# Patient Record
Sex: FEMALE | Race: OTHER | NOT HISPANIC OR LATINO | Employment: STUDENT | URBAN - METROPOLITAN AREA
[De-identification: names, ages, dates, MRNs, and addresses within clinical notes are randomized per-mention and may not be internally consistent; named-entity substitution may affect disease eponyms.]

---

## 2018-02-15 ENCOUNTER — HOSPITAL ENCOUNTER (EMERGENCY)
Facility: HOSPITAL | Age: 18
Discharge: HOME | End: 2018-02-15
Attending: EMERGENCY MEDICINE | Admitting: EMERGENCY MEDICINE
Payer: COMMERCIAL

## 2018-02-15 VITALS
HEIGHT: 65 IN | RESPIRATION RATE: 18 BRPM | DIASTOLIC BLOOD PRESSURE: 61 MMHG | BODY MASS INDEX: 26.66 KG/M2 | HEART RATE: 100 BPM | OXYGEN SATURATION: 100 % | WEIGHT: 160 LBS | SYSTOLIC BLOOD PRESSURE: 137 MMHG | TEMPERATURE: 98 F

## 2018-02-15 DIAGNOSIS — R00.0 TACHYCARDIA: ICD-10-CM

## 2018-02-15 DIAGNOSIS — K29.70 GASTRITIS: Primary | ICD-10-CM

## 2018-02-15 LAB
ALBUMIN SERPL BCP-MCNC: 3.9 G/DL (ref 3.5–5)
ALP SERPL-CCNC: 40 U/L (ref 46–384)
ALT SERPL W P-5'-P-CCNC: 19 U/L (ref 12–78)
ANION GAP SERPL CALCULATED.3IONS-SCNC: 8 MMOL/L (ref 4–13)
AST SERPL W P-5'-P-CCNC: 18 U/L (ref 5–45)
BACTERIA UR QL AUTO: ABNORMAL /HPF
BASOPHILS # BLD AUTO: 0.01 THOUSANDS/ΜL (ref 0–0.1)
BASOPHILS NFR BLD AUTO: 0 % (ref 0–1)
BILIRUB SERPL-MCNC: 0.44 MG/DL (ref 0.2–1)
BILIRUB UR QL STRIP: NEGATIVE
BUN SERPL-MCNC: 8 MG/DL (ref 5–25)
CALCIUM SERPL-MCNC: 8.4 MG/DL (ref 8.3–10.1)
CHLORIDE SERPL-SCNC: 105 MMOL/L (ref 100–108)
CLARITY UR: CLEAR
CO2 SERPL-SCNC: 25 MMOL/L (ref 21–32)
COLOR UR: YELLOW
COLOR, POC: YELLOW
CREAT SERPL-MCNC: 0.74 MG/DL (ref 0.6–1.3)
EOSINOPHIL # BLD AUTO: 0.01 THOUSAND/ΜL (ref 0–0.61)
EOSINOPHIL NFR BLD AUTO: 0 % (ref 0–6)
ERYTHROCYTE [DISTWIDTH] IN BLOOD BY AUTOMATED COUNT: 13.2 % (ref 11.6–15.1)
EXT PREG TEST URINE: NEGATIVE
GFR SERPL CREATININE-BSD FRML MDRD: 118 ML/MIN/1.73SQ M
GLUCOSE SERPL-MCNC: 95 MG/DL (ref 65–140)
GLUCOSE UR STRIP-MCNC: NEGATIVE MG/DL
HCT VFR BLD AUTO: 37.8 % (ref 34.8–46.1)
HGB BLD-MCNC: 12.9 G/DL (ref 11.5–15.4)
HGB UR QL STRIP.AUTO: ABNORMAL
KETONES UR STRIP-MCNC: ABNORMAL MG/DL
LEUKOCYTE ESTERASE UR QL STRIP: ABNORMAL
LIPASE SERPL-CCNC: 124 U/L (ref 73–393)
LYMPHOCYTES # BLD AUTO: 0.9 THOUSANDS/ΜL (ref 0.6–4.47)
LYMPHOCYTES NFR BLD AUTO: 16 % (ref 14–44)
MCH RBC QN AUTO: 28.2 PG (ref 26.8–34.3)
MCHC RBC AUTO-ENTMCNC: 34.1 G/DL (ref 31.4–37.4)
MCV RBC AUTO: 83 FL (ref 82–98)
MONOCYTES # BLD AUTO: 0.5 THOUSAND/ΜL (ref 0.17–1.22)
MONOCYTES NFR BLD AUTO: 9 % (ref 4–12)
MUCOUS THREADS UR QL AUTO: ABNORMAL
NEUTROPHILS # BLD AUTO: 4.35 THOUSANDS/ΜL (ref 1.85–7.62)
NEUTS SEG NFR BLD AUTO: 75 % (ref 43–75)
NITRITE UR QL STRIP: NEGATIVE
NON-SQ EPI CELLS URNS QL MICRO: ABNORMAL /HPF
NRBC BLD AUTO-RTO: 0 /100 WBCS
PH UR STRIP.AUTO: 5.5 [PH] (ref 4.5–8)
PLATELET # BLD AUTO: 244 THOUSANDS/UL (ref 149–390)
PMV BLD AUTO: 9.7 FL (ref 8.9–12.7)
POTASSIUM SERPL-SCNC: 3.8 MMOL/L (ref 3.5–5.3)
PROT SERPL-MCNC: 8 G/DL (ref 6.4–8.2)
PROT UR STRIP-MCNC: ABNORMAL MG/DL
RBC # BLD AUTO: 4.58 MILLION/UL (ref 3.81–5.12)
RBC #/AREA URNS AUTO: ABNORMAL /HPF
SODIUM SERPL-SCNC: 138 MMOL/L (ref 136–145)
SP GR UR STRIP.AUTO: 1.02 (ref 1–1.03)
UROBILINOGEN UR QL STRIP.AUTO: 0.2 E.U./DL
WBC # BLD AUTO: 5.78 THOUSAND/UL (ref 4.31–10.16)
WBC #/AREA URNS AUTO: ABNORMAL /HPF

## 2018-02-15 PROCEDURE — 96374 THER/PROPH/DIAG INJ IV PUSH: CPT

## 2018-02-15 PROCEDURE — 85025 COMPLETE CBC W/AUTO DIFF WBC: CPT | Performed by: EMERGENCY MEDICINE

## 2018-02-15 PROCEDURE — 36415 COLL VENOUS BLD VENIPUNCTURE: CPT

## 2018-02-15 PROCEDURE — 99284 EMERGENCY DEPT VISIT MOD MDM: CPT

## 2018-02-15 PROCEDURE — 83690 ASSAY OF LIPASE: CPT | Performed by: EMERGENCY MEDICINE

## 2018-02-15 PROCEDURE — 81025 URINE PREGNANCY TEST: CPT | Performed by: EMERGENCY MEDICINE

## 2018-02-15 PROCEDURE — 81002 URINALYSIS NONAUTO W/O SCOPE: CPT | Performed by: EMERGENCY MEDICINE

## 2018-02-15 PROCEDURE — 96361 HYDRATE IV INFUSION ADD-ON: CPT

## 2018-02-15 PROCEDURE — 80053 COMPREHEN METABOLIC PANEL: CPT | Performed by: EMERGENCY MEDICINE

## 2018-02-15 PROCEDURE — 81001 URINALYSIS AUTO W/SCOPE: CPT

## 2018-02-15 RX ORDER — DICYCLOMINE HCL 20 MG
20 TABLET ORAL 2 TIMES DAILY
Qty: 20 TABLET | Refills: 0 | Status: SHIPPED | OUTPATIENT
Start: 2018-02-15 | End: 2020-02-11

## 2018-02-15 RX ORDER — NORETHINDRONE ACETATE AND ETHINYL ESTRADIOL AND FERROUS FUMARATE 1MG-20(24)
1 KIT ORAL DAILY
COMMUNITY

## 2018-02-15 RX ORDER — ONDANSETRON 2 MG/ML
4 INJECTION INTRAMUSCULAR; INTRAVENOUS ONCE
Status: COMPLETED | OUTPATIENT
Start: 2018-02-15 | End: 2018-02-15

## 2018-02-15 RX ORDER — ONDANSETRON 4 MG/1
4 TABLET, FILM COATED ORAL EVERY 6 HOURS
Qty: 12 TABLET | Refills: 0 | Status: SHIPPED | OUTPATIENT
Start: 2018-02-15 | End: 2020-02-11

## 2018-02-15 RX ORDER — DICYCLOMINE HCL 20 MG
20 TABLET ORAL ONCE
Status: COMPLETED | OUTPATIENT
Start: 2018-02-15 | End: 2018-02-15

## 2018-02-15 RX ORDER — MAGNESIUM HYDROXIDE/ALUMINUM HYDROXICE/SIMETHICONE 120; 1200; 1200 MG/30ML; MG/30ML; MG/30ML
30 SUSPENSION ORAL ONCE
Status: COMPLETED | OUTPATIENT
Start: 2018-02-15 | End: 2018-02-15

## 2018-02-15 RX ADMIN — LIDOCAINE HYDROCHLORIDE 15 ML: 20 SOLUTION ORAL; TOPICAL at 19:31

## 2018-02-15 RX ADMIN — SODIUM CHLORIDE 1000 ML: 0.9 INJECTION, SOLUTION INTRAVENOUS at 19:26

## 2018-02-15 RX ADMIN — ONDANSETRON 4 MG: 2 INJECTION INTRAMUSCULAR; INTRAVENOUS at 19:28

## 2018-02-15 RX ADMIN — ALUMINUM HYDROXIDE, MAGNESIUM HYDROXIDE, AND SIMETHICONE 30 ML: 200; 200; 20 SUSPENSION ORAL at 19:31

## 2018-02-15 RX ADMIN — DICYCLOMINE HYDROCHLORIDE 20 MG: 20 TABLET ORAL at 19:29

## 2018-02-16 NOTE — ED NOTES
Pt  Discharged from department by Dr Rimma Bettencourt  RN not present during discharge        Osito Alcocer RN  02/15/18 7876

## 2018-02-16 NOTE — ED PROVIDER NOTES
History  Chief Complaint   Patient presents with    Abdominal Pain     Pt was seen at [facility] for abdominal pain, cough, congestion, nausea and diarrhea and was found to be tachycardic   pt told to go to the ER for evaluation  66-year-old female presenting to the ER today with a 1 day history of upper abdominal pain, nausea, vomiting and diarrhea  Patient is a student at [employer] and went to the [facility] and was referred to the ER for tachycardia and suspicion of dehydration  Symptoms began this morning  Symptoms are constant  Worsened with eating and drinking  Cannot identify any palliating factors  Denies fevers, chills  Denies sick contacts  Does states she eat red meat last night for the 1st time in months  Denies any new medications  History provided by:  Patient   used: No    Abdominal Pain   Pain location:  Epigastric  Pain quality: aching    Pain radiates to:  Does not radiate  Pain severity:  Mild  Onset quality:  Gradual  Timing:  Constant  Progression:  Unchanged  Chronicity:  New  Context: diet changes    Relieved by:  Nothing  Worsened by:  Nothing  Ineffective treatments:  None tried  Associated symptoms: no chills, no constipation, no diarrhea, no dysuria, no fatigue, no fever, no hematuria, no nausea, no vaginal bleeding, no vaginal discharge and no vomiting        Prior to Admission Medications   Prescriptions Last Dose Informant Patient Reported? Taking?   norethindrone-ethinyl estradiol-ferrous fumarate (LOESTIN 24 FE) 1-20 MG-MCG(24) per tablet   Yes Yes   Sig: Take 1 tablet by mouth daily      Facility-Administered Medications: None       History reviewed  No pertinent past medical history  History reviewed  No pertinent surgical history  History reviewed  No pertinent family history  I have reviewed and agree with the history as documented      Social History   Substance Use Topics    Smoking status: Never Smoker    Smokeless tobacco: Never Used    Alcohol use No        Review of Systems   Constitutional: Negative  Negative for appetite change, chills, diaphoresis, fatigue and fever  HENT: Negative  Eyes: Negative  Respiratory: Negative  Cardiovascular: Negative  Gastrointestinal: Positive for abdominal pain  Negative for blood in stool, constipation, diarrhea, nausea and vomiting  Endocrine: Negative  Genitourinary: Negative for decreased urine volume, difficulty urinating, dyspareunia, dysuria, flank pain, frequency, hematuria, pelvic pain, urgency, vaginal bleeding, vaginal discharge and vaginal pain  Musculoskeletal: Negative  Skin: Negative  Allergic/Immunologic: Negative  Neurological: Negative  Psychiatric/Behavioral: Negative  All other systems reviewed and are negative  Physical Exam  ED Triage Vitals [02/15/18 1801]   Temperature Pulse Respirations Blood Pressure SpO2   98 °F (36 7 °C) (!) 116 18 126/66 98 %      Temp Source Heart Rate Source Patient Position - Orthostatic VS BP Location FiO2 (%)   Oral Monitor Sitting Right arm --      Pain Score       6           Orthostatic Vital Signs  Vitals:    02/15/18 1801 02/15/18 1930 02/15/18 2030   BP: 126/66 137/64 137/61   Pulse: (!) 116 104 100   Patient Position - Orthostatic VS: Sitting         Physical Exam   Constitutional: She is oriented to person, place, and time  She appears well-developed and well-nourished  HENT:   Head: Normocephalic and atraumatic  Right Ear: External ear normal    Left Ear: External ear normal    Nose: Nose normal    Mouth/Throat: Oropharynx is clear and moist    Eyes: Conjunctivae and EOM are normal  Pupils are equal, round, and reactive to light  Neck: Normal range of motion  Neck supple  No JVD present  No tracheal deviation present  No thyromegaly present  Cardiovascular: Normal rate, regular rhythm, normal heart sounds and intact distal pulses    Exam reveals no gallop and no friction rub  No murmur heard  Pulmonary/Chest: Effort normal and breath sounds normal  No stridor  No respiratory distress  She has no wheezes  She has no rales  She exhibits no tenderness  Abdominal: Soft  Bowel sounds are normal  She exhibits no distension and no mass  There is tenderness in the epigastric area  There is no rebound and no guarding  No hernia  Musculoskeletal: Normal range of motion  She exhibits no edema, tenderness or deformity  Lymphadenopathy:     She has no cervical adenopathy  Neurological: She is alert and oriented to person, place, and time  She has normal reflexes  She displays normal reflexes  No cranial nerve deficit  She exhibits normal muscle tone  Coordination normal    Skin: Skin is warm  No rash noted  No erythema  No pallor  Psychiatric: She has a normal mood and affect  Her behavior is normal  Judgment and thought content normal    Nursing note and vitals reviewed        ED Medications  Medications   sodium chloride 0 9 % bolus 1,000 mL (0 mL Intravenous Stopped 2/15/18 2041)   ondansetron (ZOFRAN) injection 4 mg (4 mg Intravenous Given 2/15/18 1928)   aluminum-magnesium hydroxide-simethicone (MYLANTA) 200-200-20 mg/5 mL oral suspension 30 mL (30 mL Oral Given 2/15/18 1931)   lidocaine viscous (XYLOCAINE) 2 % mucosal solution 15 mL (15 mL Swish & Spit Given 2/15/18 1931)   dicyclomine (BENTYL) tablet 20 mg (20 mg Oral Given 2/15/18 1929)       Diagnostic Studies  Results Reviewed     Procedure Component Value Units Date/Time    Urine Microscopic [[id number]]  (Abnormal) Collected:  02/15/18 2122    Lab Status:  Final result Specimen:  Urine from Urine, Other Updated:  02/15/18 2205     RBC, UA None Seen /hpf      WBC, UA 4-10 (A) /hpf      Epithelial Cells Occasional /hpf      Bacteria, UA Occasional /hpf      MUCOUS THREADS Occasional    POCT pregnancy, urine [[id number]]  (Normal) Resulted:  02/15/18 2120    Lab Status:  Final result Updated:  02/15/18 2120     EXT PREG TEST UR (Ref: Negative) negative    POCT urinalysis dipstick [[id number]]  (Normal) Resulted:  02/15/18 2120    Lab Status:  Final result Specimen:  Urine Updated:  02/15/18 2120     Color, UA yellow    ED Urine Macroscopic [[id number]]  (Abnormal) Collected:  02/15/18 2122    Lab Status:  Final result Specimen:  Urine Updated:  02/15/18 2118     Color, UA Yellow     Clarity, UA Clear     pH, UA 5 5     Leukocytes, UA Small (A)     Nitrite, UA Negative     Protein, UA Trace (A) mg/dl      Glucose, UA Negative mg/dl      Ketones, UA Trace (A) mg/dl      Urobilinogen, UA 0 2 E U /dl      Bilirubin, UA Negative     Blood, UA Large (A)     Specific [place], UA 1 025    Narrative:       CLINITEK RESULT    Comprehensive metabolic panel [[id number]]  (Abnormal) Collected:  02/15/18 1814    Lab Status:  Final result Specimen:  Blood from Arm, Right Updated:  02/15/18 1901     Sodium 138 mmol/L      Potassium 3 8 mmol/L      Chloride 105 mmol/L      CO2 25 mmol/L      Anion Gap 8 mmol/L      BUN 8 mg/dL      Creatinine 0 74 mg/dL      Glucose 95 mg/dL      Calcium 8 4 mg/dL      AST 18 U/L      ALT 19 U/L      Alkaline Phosphatase 40 (L) U/L      Total Protein 8 0 g/dL      Albumin 3 9 g/dL      Total Bilirubin 0 44 mg/dL      eGFR 118 ml/min/1 73sq m     Narrative:         National Kidney Disease Education Program recommendations are as follows:  GFR calculation is accurate only with a steady state creatinine  Chronic Kidney disease less than 60 ml/min/1 73 sq  meters  Kidney failure less than 15 ml/min/1 73 sq  meters      Lipase [[id number]]  (Normal) Collected:  02/15/18 1814    Lab Status:  Final result Specimen:  Blood from Arm, Right Updated:  02/15/18 1901     Lipase 124 u/L     CBC and differential [[id number]]  (Normal) Collected:  02/15/18 1814    Lab Status:  Final result Specimen:  Blood from Arm, Right Updated:  02/15/18 1823     WBC 5 78 Thousand/uL      RBC 4 58 Million/uL      Hemoglobin 12 9 g/dL Hematocrit 37 8 %      MCV 83 fL      MCH 28 2 pg      MCHC 34 1 g/dL      RDW 13 2 %      MPV 9 7 fL      Platelets 411 Thousands/uL      nRBC 0 /100 WBCs      Neutrophils Relative 75 %      Lymphocytes Relative 16 %      Monocytes Relative 9 %      Eosinophils Relative 0 %      Basophils Relative 0 %      Neutrophils Absolute 4 35 Thousands/µL      Lymphocytes Absolute 0 90 Thousands/µL      Monocytes Absolute 0 50 Thousand/µL      Eosinophils Absolute 0 01 Thousand/µL      Basophils Absolute 0 01 Thousands/µL                  No orders to display         Procedures  Procedures      Phone Consults  ED Phone Contact    ED Course  ED Course as of Feb 16 0101   u Feb 15, 2018   2119 Leukocytes, UA: (!) Small   2119 Ketones, UA: (!) Trace   2126   On re-evaluation patient's symptoms have improved  No longer tachycardic    Will discharge patient at this time                                MDM  Number of Diagnoses or Management Options  Gastritis: new and requires workup  Tachycardia: new and requires workup  Diagnosis management comments: Assessment:  -abdominal pain epigastric  -nausea  -vomiting and diarrhea  -tachycardia  Plan:  -lipase to rule out pancreatitis  -CMP to evaluate electrolytes assess hepatic biliary function  -UA with U preg  -will treat symptoms with IV fluids, Zofran, Bentyl, GI cocktail  -will re-evaluate       Amount and/or Complexity of Data Reviewed  Clinical lab tests: ordered and reviewed  Tests in the radiology section of CPT®: ordered and reviewed  Tests in the medicine section of CPT®: reviewed and ordered  Decide to obtain previous medical records or to obtain history from someone other than the patient: yes  Independent visualization of images, tracings, or specimens: yes    Patient Progress  Patient progress: stable    CritCare Time    Disposition  Final diagnoses:   Gastritis   Tachycardia     Time reflects when diagnosis was documented in both MDM as applicable and the Disposition within this note     Time User Action Codes Description Comment    2/15/2018  9:26 PM Chele Rodarte Add [K29 70] Gastritis     2/15/2018  9:27 PM SangVicky Add [R00 0] Tachycardia       ED Disposition     ED Disposition Condition Comment    Discharge  [facility]/self care  Condition at discharge: Good        Follow-up Information     Follow up With Specialties Details Why Contact Info    [employer]fol[employer]  Schedule an appointment as soon as possible for a visit  [phone]          Discharge Medication List as of 2/15/2018  9:30 PM      START taking these medications    Details   dicyclomine (BENTYL) 20 mg tablet Take 1 tablet (20 mg total) by mouth 2 (two) times a day, Starting Thu 2/15/2018, Print      ondansetron (ZOFRAN) 4 mg tablet Take 1 tablet (4 mg total) by mouth every 6 (six) hours, Starting Thu 2/15/2018, Print         CONTINUE these medications which have NOT CHANGED    Details   norethindrone-ethinyl estradiol-ferrous fumarate (LOESTIN 24 FE) 1-20 MG-MCG(24) per tablet Take 1 tablet by mouth daily, Historical Med           No discharge procedures on file  ED Provider  Attending physically available and evaluated [facility]  I managed the patient along with the ED Attending      Electronically Signed by         Quiana Dye DO  02/16/18 010

## 2018-02-16 NOTE — DISCHARGE INSTRUCTIONS
Atrial Tachycardia   WHAT YOU SHOULD KNOW:   Atrial tachycardia (AT) is a condition that causes your heart to beat 100 to 300 times each minute  A normal heart rate at rest is 60 to 80 beats each minute  AT develops because of problems with your heart's electrical system  Your atria (top chambers) may send electrical signals that increase your heart rate, or the pathway of the electrical signal may be blocked  Your heart keeps sending signals to try to get past the block  CARE AGREEMENT:   You have the right to help plan your care  Learn about your health condition and how it may be treated  Discuss treatment options with your caregivers to decide what care you want to receive  You always have the right to refuse treatment  RISKS:   · Medicines to treat your AT may cause your heart to beat too slowly or your blood pressure to drop  Certain medicines may cause other types of heartbeat problems  Cardiac ablation can cause you to bleed, bruise, or get an infection where the wire was put in  Even after treatment, your AT and symptoms may return, and you may need more treatments  · Without treatment, your symptoms may get worse  Your heart may not be able to pump enough blood to supply oxygen to the rest of your body  You may get a blood clot The clot can break loose and travel to your lungs or brain  A blood clot can cause you to have a stroke  Your heart may weaken and not work properly  You are also at a higher risk for a heart attack and heart failure  WHILE YOU ARE HERE:   Informed consent  is a legal document that explains the tests, treatments, or procedures that you may need  Informed consent means you understand what will be done and can make decisions about what you want  You give your permission when you sign the consent form  You can have someone sign this form for you if you are not able to sign it  You have the right to understand your medical care in words you know   Before you sign the consent form, understand the risks and benefits of what will be done  Make sure all your questions are answered  An IV  is a small tube placed in your vein that is used to give you medicine or liquids  Heart monitor: This is also called an ECG or EKG  Sticky pads placed on your skin record your heart's electrical activity  Medicines:   · Antiarrhythmias: These help slow your heartbeat and make it more normal      · Beta blockers: These help keep your heartbeat in a regular rhythm  · Calcium channel blockers: These help slow your heartbeat  · Blood thinners: These help prevent blood clots  Clots can lead to stroke, heart attack, and death  Aspirin is a type of blood thinner  You may need to take an aspirin each day to help prevent blood clots  Do not take acetaminophen or ibuprofen instead  Do not take more or less aspirin than healthcare providers say to take  If you are on other blood thinner medicine, ask your healthcare provider before you take aspirin for any reason  · Electrolytes: You may be given electrolytes in the hospital if an electrolyte imbalance caused your AT  Tests:   · Blood tests: You may need blood taken to give caregivers information about how your body is working  The blood may be taken from your hand, arm, or IV  · Electrophysiologic study: An electrophysiologic study is used to chart the electrical pathways in your heart that control your heartbeat  Wires are guided through a blood vessel in your arm, neck, chest, or groin to your heart  Readings are taken through the wires  Your healthcare provider can also use these wires to trigger your heart rhythm problem  · An echocardiogram  is a type of ultrasound  Sound waves are used to show the structure and function of your heart  · ECG:  This is also called an EKG  An ECG is done to check for damage or problems in your heart  A short period of electrical activity in your heart is recorded   Lie as still as possible during the test      · Atrial electrograms:  Atrial electrograms are ECG recordings taken from temporary wires attached to your atria during heart surgery  The wires are connected to a monitor and show your heart's atrial activity  This test is done if healthcare providers believe you are having AT after heart surgery  · Exercise stress test:  An exercise stress test helps healthcare providers see the changes that take place in your heart during exercise  The test is done while you ride an exercise bike or walk on a treadmill  Healthcare providers will ask if you have chest pain or trouble breathing during the test   Treatment:   · Vagal maneuvers:  Vagal maneuvers (methods) can help slow the impulse from your atria to your ventricle and stop your AT  An example of a vagal maneuver is putting your face in ice cold water  Your healthcare provider may teach you other vagal maneuvers to do on your own when you have an episode of AT  · Cardioversion: This is a procedure where an electric shock is given to your heart  The shock is usually given through paddles or sticky patches placed on your chest or back  The shock helps your heart return to a normal beat  Cardioversion may be needed if medicine does not make your heart work better  You may need a cardioversion if your heart rhythm is making you sick or is dangerous  You may be given medicine to help you relax before getting the electric shock  If the shock works, your heart rate and rhythm will return to normal  Medicine may be needed to keep your heart in a normal rhythm  You may need a cardioversion more than once  · Cardiac ablation:  Cardiac ablation is a procedure that uses heat energy to stop abnormal heart impulses  A wire is guided to your heart through an artery or a vein  Your healthcare provider finds the area of the heart that is causing the problems and applies heat energy to it  This may help your heart beat in a more regular rhythm      · Pacemaker: This is a machine that helps your heart beat at a normal speed and in a regular rhythm  If your heart does not beat as it should, the pacemaker sends small electric signals to your heart  You may feel these signals  ¨ Temporary:  Large patches are placed on your chest and back  The patches are connected to a monitor  Your healthcare provider may need to put small wires through your skin and into your heart muscle instead  The wires are connected to a small pacemaker box outside of your body  ¨ Permanent:  A permanent pacemaker is about the size of a wristwatch  It is implanted under the skin of your chest     · Surgery: You may need surgery if other treatments do not work to stop your AT   © 2014 [address] is for End User's use only and may not be sold, redistributed or otherwise used for commercial purposes  All illustrations and images included in CareNotes® are the copyrighted property of [employer]  or [employer]  The above information is an  only  It is not intended as medical advice for individual conditions or treatments  Talk to your doctor, nurse or pharmacist before following any medical regimen to see if it is safe and effective for you

## 2018-02-26 NOTE — ED ATTENDING ATTESTATION
Lauri Souza MD, saw and evaluated the patient  I have discussed the patient with the resident/non-physician practitioner and agree with the resident's/non-physician practitioner's findings, Plan of Care, and MDM as documented in the resident's/non-physician practitioner's note, except where noted  All available labs and Radiology studies were reviewed  At this point I agree with the current assessment done in the Emergency Department  I have conducted an independent evaluation of this patient a history and physical is as follows:    Patient presents with 1 day of upper abdominal pain and N/V/D  Patient was found to be tachycardic at [facility] and was sent to the ED  Patient's symptoms made worse with eating and drinking  No additional complaints  Exam: AAOx3, NAD, RRR, CTA, epigastric TTP  A/P: Abdominal pain, tachycardia  Will check labs to evaluate abd abnormalities, EKG, urine, and give IVF      Critical Care Time  CritCare Time    Procedures

## 2020-02-11 ENCOUNTER — HOSPITAL ENCOUNTER (EMERGENCY)
Facility: HOSPITAL | Age: 20
Discharge: HOME | End: 2020-02-11
Attending: EMERGENCY MEDICINE | Admitting: EMERGENCY MEDICINE
Payer: COMMERCIAL

## 2020-02-11 ENCOUNTER — APPOINTMENT (EMERGENCY)
Dept: RADIOLOGY | Facility: HOSPITAL | Age: 20
End: 2020-02-11
Payer: COMMERCIAL

## 2020-02-11 VITALS
OXYGEN SATURATION: 98 % | SYSTOLIC BLOOD PRESSURE: 119 MMHG | TEMPERATURE: 100 F | RESPIRATION RATE: 18 BRPM | WEIGHT: 147.27 LBS | BODY MASS INDEX: 24.51 KG/M2 | HEART RATE: 80 BPM | DIASTOLIC BLOOD PRESSURE: 76 MMHG

## 2020-02-11 DIAGNOSIS — J11.1 INFLUENZA: Primary | ICD-10-CM

## 2020-02-11 LAB
FLUAV RNA NPH QL NAA+PROBE: ABNORMAL
FLUBV RNA NPH QL NAA+PROBE: DETECTED
RSV RNA NPH QL NAA+PROBE: ABNORMAL

## 2020-02-11 PROCEDURE — 93005 ELECTROCARDIOGRAM TRACING: CPT

## 2020-02-11 PROCEDURE — 99285 EMERGENCY DEPT VISIT HI MDM: CPT | Performed by: EMERGENCY MEDICINE

## 2020-02-11 PROCEDURE — 99284 EMERGENCY DEPT VISIT MOD MDM: CPT

## 2020-02-11 PROCEDURE — 71046 X-RAY EXAM CHEST 2 VIEWS: CPT

## 2020-02-11 PROCEDURE — 87631 RESP VIRUS 3-5 TARGETS: CPT | Performed by: EMERGENCY MEDICINE

## 2020-02-11 RX ORDER — ACETAMINOPHEN 325 MG/1
975 TABLET ORAL ONCE
Status: COMPLETED | OUTPATIENT
Start: 2020-02-11 | End: 2020-02-11

## 2020-02-11 RX ORDER — IBUPROFEN 600 MG/1
600 TABLET ORAL ONCE
Status: COMPLETED | OUTPATIENT
Start: 2020-02-11 | End: 2020-02-11

## 2020-02-11 RX ADMIN — ACETAMINOPHEN 975 MG: 325 TABLET ORAL at 19:04

## 2020-02-11 RX ADMIN — IBUPROFEN 600 MG: 600 TABLET ORAL at 19:04

## 2020-02-12 LAB
ATRIAL RATE: 86 BPM
P AXIS: 30 DEGREES
PR INTERVAL: 142 MS
QRS AXIS: 31 DEGREES
QRSD INTERVAL: 78 MS
QT INTERVAL: 330 MS
QTC INTERVAL: 394 MS
T WAVE AXIS: 30 DEGREES
VENTRICULAR RATE: 86 BPM

## 2020-02-12 PROCEDURE — 93010 ELECTROCARDIOGRAM REPORT: CPT | Performed by: INTERNAL MEDICINE

## 2020-02-12 NOTE — ED ATTENDING ATTESTATION
2/11/2020  I, Roxana Lala MD, saw and evaluated the patient  I have discussed the patient with the resident/non-physician practitioner and agree with the resident's/non-physician practitioner's findings, Plan of Care, and MDM as documented in the resident's/non-physician practitioner's note, except where noted  All available labs and Radiology studies were reviewed  I was present for key portions of any procedure(s) performed by the resident/non-physician practitioner and I was immediately available to provide assistance  At this point I agree with the current assessment done in the Emergency Department    I have conducted an independent evaluation of this patient a history and physical is as follows:  Pt was sent with tachycardia uri symptoms since Friday Pt has a cough no sob no ha Pt has had low grade temps Last antipyretic was at noon no nvd  Pt was seen at [facility] and was given po fluids still with some tachy  Pt denies chest painPE: alert nad heart reg lungs clear pharynx wnl abd soft nontender MDM: will check flu ekg cxr  ED Course         Critical Care Time  Procedures

## 2020-02-14 NOTE — ED PROVIDER NOTES
History  Chief Complaint   Patient presents with    Fever - 9 weeks to 76 years     seen at [facility] for URI, and fever, was drinking PO fluids but HR remained high so they sent her to ER, given advil at 1200, no N/V, cough, body aches     23 yo F presenting for evaluation from [facility] today for tachycardia in the setting of fever and likely viral illness  Has been experiencing myalgias, and fever since yesterday  Mild runny nose, mild nonproductive cough  Denies any respiratory distress or shortness of breath  No nausea, vomiting, diarrhea or other GI symptoms  Denies rashes or HA  Was giving large amount of fluids PO at [facility] but still had tachycardia up to 115 so she was sent here for further eval  No CP or palpitations  ROS otherwise neg as below  History provided by:  Patient   used: No        Prior to Admission Medications   Prescriptions Last Dose Informant Patient Reported? Taking?   norethindrone-ethinyl estradiol-ferrous fumarate (LOESTIN 24 FE) 1-20 MG-MCG(24) per tablet   Yes Yes   Sig: Take 1 tablet by mouth daily      Facility-Administered Medications: None       History reviewed  No pertinent past medical history  History reviewed  No pertinent surgical history  History reviewed  No pertinent family history  I have reviewed and agree with the history as documented  Social History     Tobacco Use    Smoking status: Never Smoker    Smokeless tobacco: Never Used   Substance Use Topics    Alcohol use: No    Drug use: No        Review of Systems   Constitutional: Negative for chills, fatigue and fever  HENT: Positive for rhinorrhea  Negative for congestion and sore throat  Eyes: Negative for redness and visual disturbance  Respiratory: Positive for cough  Negative for shortness of breath and wheezing  Cardiovascular: Negative for chest pain and palpitations     Gastrointestinal: Negative for abdominal pain, diarrhea, nausea and vomiting  Genitourinary: Negative for difficulty urinating, dysuria, hematuria and urgency  Musculoskeletal: Negative for back pain and gait problem  Skin: Negative for pallor and rash  Neurological: Negative for dizziness, syncope, weakness, light-headedness and headaches  Psychiatric/Behavioral: Negative for confusion  The patient is not nervous/anxious  Physical Exam  ED Triage Vitals [02/11/20 1706]   Temperature Pulse Respirations Blood Pressure SpO2   100 °F (37 8 °C) (!) 117 18 139/89 100 %      Temp Source Heart Rate Source Patient Position - Orthostatic VS BP Location FiO2 (%)   Oral Monitor Sitting Right arm --      Pain Score       No Pain             Orthostatic Vital Signs  Vitals:    02/11/20 1900 02/11/20 1906 02/11/20 1945 02/11/20 2100   BP: 120/60 120/60  119/76   Pulse: (!) 118 (!) 122 98 80   Patient Position - Orthostatic VS: Lying Sitting  Lying       Physical Exam   Constitutional: She appears well-developed and well-nourished  No distress  HENT:   Head: Normocephalic and atraumatic  Eyes: Pupils are equal, round, and reactive to light  Conjunctivae are normal    Neck: Normal range of motion  Neck supple  Cardiovascular: Normal rate, regular rhythm and normal heart sounds  No murmur heard  Pulmonary/Chest: Effort normal and breath sounds normal  No respiratory distress  She has no wheezes  She has no rales  Abdominal: Soft  Bowel sounds are normal  She exhibits no distension  There is no tenderness  Musculoskeletal: Normal range of motion  Neurological: She is alert  Skin: Skin is warm and dry  No rash noted  She is not diaphoretic  Psychiatric: She has a normal mood and affect  Nursing note and vitals reviewed        ED Medications  Medications   ibuprofen (MOTRIN) tablet 600 mg (600 mg Oral Given 2/11/20 1904)   acetaminophen (TYLENOL) tablet 975 mg (975 mg Oral Given 2/11/20 1904)       Diagnostic Studies  Results Reviewed Procedure Component Value Units Date/Time    Influenza A/B and RSV PCR [[id number]]  (Abnormal) Collected:  02/11/20 1915    Lab Status:  Final result Specimen:  Nasopharyngeal Swab Updated:  02/11/20 2010     INFLUENZA A PCR None Detected     INFLUENZA B PCR Detected     RSV PCR None Detected                 XR chest 2 views   ED Interpretation by Beth Gee MD (02/11 2011)   No acute cardiopulm process  Final Result by Rhoda Villegas MD (02/11 2100)      No acute cardiopulmonary disease  Workstation performed: [id number]               Procedures  ECG 12 Lead Documentation Only  Date/Time: 2/11/2020 5:26 PM  Performed by: Beth Gee MD  Authorized by: Beth Gee MD     Comments:      NSR, tachycardia resolved           ED Course  ED Course as of Feb 14 1728 Tue Feb 11, 2020 2010 INFLU B PCR(!): Detected                               MDM  Number of Diagnoses or Management Options  Influenza:   Diagnosis management comments: 21 yo F presenting for evaluation of fever, URI symptoms and tachycardia  Well appearing on exam, mild tachycardia resolved shortly after triage and evaluation  Concern for possible influenza given fevers and myalgias  Influenza testing positive  Discussed tamiflu, patient declined due to side effects  Supportive care at home  Discharged in good condition  Disposition  Final diagnoses:   Influenza     Time reflects when diagnosis was documented in both MDM as applicable and the Disposition within this note     Time User Action Codes Description Comment    2/11/2020  9:13 PM Adeel Green Add [J11 1] Influenza       ED Disposition     ED Disposition Condition Date/Time Comment    Discharge Stable Tue Feb 11, 2020  9:13 PM Venancio Blizzard discharge to home/self care              Follow-up Information     Follow up With Specialties Details Why Contact Info Additional [address] [facility] Schedule an appointment as soon as possible for a visit  As needed [address] [facility] [id number]  [address] [facility], [address] [facility]  [facility], [place], [place], [address]   [facility] Emergency Department Emergency Medicine Go to  As needed [address] [facility], [address], [address] [phone]          Discharge Medication List as of 2/11/2020  9:13 PM      CONTINUE these medications which have NOT CHANGED    Details   norethindrone-ethinyl estradiol-ferrous fumarate (LOESTIN 24 FE) 1-20 MG-MCG(24) per tablet Take 1 tablet by mouth daily, Historical Med           No discharge procedures on file  ED Provider  Attending physically available and evaluated [facility]  I managed the patient along with the ED Attending      Electronically Signed by         Francisca Gramajo MD  02/14/20 3050

## 2023-05-22 ENCOUNTER — APPOINTMENT (RX ONLY)
Dept: URBAN - METROPOLITAN AREA CLINIC 329 | Facility: CLINIC | Age: 23
Setting detail: DERMATOLOGY
End: 2023-05-22

## 2023-05-22 DIAGNOSIS — L23.9 ALLERGIC CONTACT DERMATITIS, UNSPECIFIED CAUSE: ICD-10-CM | Status: INADEQUATELY CONTROLLED

## 2023-05-22 PROCEDURE — ? PRESCRIPTION

## 2023-05-22 PROCEDURE — ? ADDITIONAL NOTES

## 2023-05-22 PROCEDURE — 99203 OFFICE O/P NEW LOW 30 MIN: CPT

## 2023-05-22 PROCEDURE — ? FULL BODY SKIN EXAM - DECLINED

## 2023-05-22 PROCEDURE — ? COUNSELING

## 2023-05-22 RX ORDER — FLUTICASONE PROPIONATE 0.05 MG/G
OINTMENT TOPICAL
Qty: 60 | Refills: 3 | Status: ERX | COMMUNITY
Start: 2023-05-22

## 2023-05-22 RX ADMIN — FLUTICASONE PROPIONATE: 0.05 OINTMENT TOPICAL at 00:00

## 2023-05-22 ASSESSMENT — LOCATION DETAILED DESCRIPTION DERM
LOCATION DETAILED: LEFT VENTRAL PROXIMAL FOREARM
LOCATION DETAILED: RIGHT PROXIMAL DORSAL FOREARM

## 2023-05-22 ASSESSMENT — LOCATION SIMPLE DESCRIPTION DERM
LOCATION SIMPLE: RIGHT FOREARM
LOCATION SIMPLE: LEFT FOREARM

## 2023-05-22 ASSESSMENT — LOCATION ZONE DERM: LOCATION ZONE: ARM

## 2023-05-22 NOTE — PROCEDURE: ADDITIONAL NOTES
Additional Notes: Patient states she is noticing more each day. She was advised to continue her daily antihistamine.
Render Risk Assessment In Note?: no
Detail Level: Simple

## 2023-05-22 NOTE — HPI: RASH
What Type Of Note Output Would You Prefer (Optional)?: Standard Output
Is The Patient Presenting As Previously Scheduled?: Yes
How Severe Is Your Rash?: moderate
Is This A New Presentation, Or A Follow-Up?: Rash
Additional History: Patient states the triamcinolone has not helped her rash at all. She has been using it 2-3 times a day.